# Patient Record
Sex: MALE | Race: BLACK OR AFRICAN AMERICAN | NOT HISPANIC OR LATINO | Employment: UNEMPLOYED | URBAN - METROPOLITAN AREA
[De-identification: names, ages, dates, MRNs, and addresses within clinical notes are randomized per-mention and may not be internally consistent; named-entity substitution may affect disease eponyms.]

---

## 2024-03-08 ENCOUNTER — OFFICE VISIT (OUTPATIENT)
Dept: OPHTHALMOLOGY | Facility: CLINIC | Age: 34
End: 2024-03-08
Payer: COMMERCIAL

## 2024-03-08 DIAGNOSIS — H18.603 KERATOCONUS OF BOTH EYES: Primary | ICD-10-CM

## 2024-03-08 DIAGNOSIS — H17.9 CORNEAL SCAR, RIGHT EYE: ICD-10-CM

## 2024-03-08 LAB
KMAX (OD): 83.2 DIOPTERS
KMAX (OS): 51.5 DIOPTERS
PACH THINNEST (OD): 437 MICRONS
PACH THINNEST (OS): 509 MICRONS
SIMK FLAT (OD): 66 DIOPTERS
SIMK FLAT (OS): 39.5 DIOPTERS
SIMK STEEP (OD): 67.2 DIOPTERS
SIMK STEEP (OS): 44 DIOPTERS

## 2024-03-08 PROCEDURE — 92025 CPTRIZED CORNEAL TOPOGRAPHY: CPT | Performed by: OPHTHALMOLOGY

## 2024-03-08 PROCEDURE — 99204 OFFICE O/P NEW MOD 45 MIN: CPT | Performed by: OPHTHALMOLOGY

## 2024-03-08 ASSESSMENT — VISUAL ACUITY
OS_CC+: +1
OS_CC: 20/40
OD_CC: 20/100
METHOD: SNELLEN - LINEAR

## 2024-03-08 ASSESSMENT — SLIT LAMP EXAM - LIDS
COMMENTS: NORMAL
COMMENTS: NORMAL

## 2024-03-08 ASSESSMENT — CUP TO DISC RATIO
OD_RATIO: 0.3
OS_RATIO: 0.3

## 2024-03-08 ASSESSMENT — TONOMETRY
OS_IOP_MMHG: 16
IOP_METHOD: TONOPEN
OD_IOP_MMHG: 13

## 2024-03-08 ASSESSMENT — EXTERNAL EXAM - RIGHT EYE: OD_EXAM: NORMAL

## 2024-03-08 ASSESSMENT — ENCOUNTER SYMPTOMS: EYES NEGATIVE: 1

## 2024-03-08 ASSESSMENT — EXTERNAL EXAM - LEFT EYE: OS_EXAM: NORMAL

## 2024-03-08 NOTE — PROGRESS NOTES
Assessment/Plan   Diagnoses and all orders for this visit:  Keratoconus of both eyes  Corneal scar, right eye  Referred by Dr. Jp Lee  Scar OD is inferior just below visual axis  Dicussed KCN disease nature with pt and explained OD (and possibly OS) could be fitted with rigid gas permeable (RGP) vs scleral CLs for best visual acuity  If vision OD does not improve with CL, will discuss DALK/PK  Pentacam hernandez today. High quality scans obtained and shows:  -Discussed the risk of progression and counseled against eye rubbing as this may lead to progression  -Discused corneal cross linking as a measure to prevent further progression of KCN  -Discussed the postop course, fluctuation of vision and the need for vision correction after CXL  -Discussed r/b/a of CXL including infection and haze.   -The patient understands and wishes to proceed  - Explained to the patient at length how CXL is important to decrease the risk of needing a corneal transplant later in life  - Explained that CXL will not improve vision  - Answered all pt questions  -Will schedule CXL (left eye)first  - Pt will follow up with Dr. Lee for CL fitting and with me for CXL and to monitor his cornea

## 2024-03-08 NOTE — LETTER
March 8, 2024    Jp Lee  [facility]   [address]  [facility] [address]    Patient: Salinas Barker   YOB: 1990   Date of Visit: 3/8/2024       Dear Dr. Jp Lee:    Thank you for referring Salinas Barker to me for evaluation. Below are the relevant portions of the exam, along with my assessment and plan of care.    Vision readings for this visit:  Visual Acuity (Snellen - Linear)         Right Left    Dist cc 20/100 20/40 +1    Dist ph cc NI NI          Tonometry (Tonopen, 8:39 AM)         Right Left    Pressure 13 16            Assessment/Plan:  Diagnoses and all orders for this visit:  Keratoconus of both eyes  Corneal scar, right eye  Referred by Dr. Jp Lee  Scar OD is inferior just below visual axis  Dicussed KCN disease nature with pt and explained OD (and possibly OS) could be fitted with rigid gas permeable (RGP) vs scleral CLs for best visual acuity  If vision OD does not improve with CL, will discuss DALK/PK  Pentacam hernandez today. High quality scans obtained and shows:  -Discussed the risk of progression and counseled against eye rubbing as this may lead to progression  -Discused corneal cross linking as a measure to prevent further progression of KCN  -Discussed the postop course, fluctuation of vision and the need for vision correction after CXL  -Discussed r/b/a of CXL including infection and haze.   -The patient understands and wishes to proceed  - Explained to the patient at length how CXL is important to decrease the risk of needing a corneal transplant later in life  - Explained that CXL will not improve vision  - Answered all pt questions  -Will schedule CXL (left eye)first  - Pt will follow up with Dr. Lee for CL fitting and with me for CXL and to monitor his cornea      If you have questions, please do not hesitate to call me. I look forward to following Salinas along with you.         Sincerely,        Hema Montiel MD        CC:   No Recipients

## 2024-03-08 NOTE — LETTER
March 8, 2024    Jp Sandoval OD  [facility]  [address]  [facility] [address]    Patient: Salinas Barker   YOB: 1990   Date of Visit: 3/8/2024       Dear Dr. Jp Sandoval OD:    Thank you for referring Salinas Barker to me for evaluation. Below are the relevant portions of the exam, along with my assessment and plan of care.    Vision readings for this visit:  Visual Acuity (Snellen - Linear)         Right Left    Dist cc 20/100 20/40 +1    Dist ph cc NI NI          Tonometry (Tonopen, 8:39 AM)         Right Left    Pressure 13 16            Assessment/Plan:     Keratoconus of both eyes  Corneal scar, right eye  Referred by Dr. Jp Lee  Scar OD is inferior just below visual axis  Dicussed KCN disease nature with pt and explained OD (and possibly OS) could be fitted with rigid gas permeable (RGP) vs scleral CLs for best visual acuity  If vision OD does not improve with CL, will discuss DALK/PK  Pentacam hernandez today. High quality scans obtained and shows:  -Discussed the risk of progression and counseled against eye rubbing as this may lead to progression  -Discused corneal cross linking as a measure to prevent further progression of KCN  -Discussed the postop course, fluctuation of vision and the need for vision correction after CXL  -Discussed r/b/a of CXL including infection and haze.   -The patient understands and wishes to proceed  - Explained to the patient at length how CXL is important to decrease the risk of needing a corneal transplant later in life  - Explained that CXL will not improve vision  - Answered all pt questions  -Will schedule CXL (left eye)first  - Pt will follow up with Dr. Lee for CL fitting and with me for CXL and to monitor his cornea    If you have questions, please do not hesitate to call me. I look forward to following Salinas along with you.         Sincerely,        Hema Montiel MD        CC:   No Recipients

## 2024-03-29 ENCOUNTER — APPOINTMENT (OUTPATIENT)
Dept: OPHTHALMOLOGY | Facility: CLINIC | Age: 34
End: 2024-03-29
Payer: COMMERCIAL

## 2024-04-23 ENCOUNTER — LAB (OUTPATIENT)
Dept: LAB | Facility: LAB | Age: 34
End: 2024-04-23

## 2024-07-09 ENCOUNTER — APPOINTMENT (OUTPATIENT)
Dept: OPHTHALMOLOGY | Facility: CLINIC | Age: 34
End: 2024-07-09
Payer: COMMERCIAL

## 2024-07-09 DIAGNOSIS — H18.603 KERATOCONUS OF BOTH EYES: Primary | ICD-10-CM

## 2024-07-09 DIAGNOSIS — H17.9 CORNEAL SCAR, RIGHT EYE: ICD-10-CM

## 2024-07-09 DIAGNOSIS — H10.13 ALLERGIC CONJUNCTIVITIS OF BOTH EYES: ICD-10-CM

## 2024-07-09 PROCEDURE — 99213 OFFICE O/P EST LOW 20 MIN: CPT | Performed by: OPHTHALMOLOGY

## 2024-07-09 ASSESSMENT — CONF VISUAL FIELD
OS_INFERIOR_NASAL_RESTRICTION: 0
OD_NORMAL: 1
OD_SUPERIOR_TEMPORAL_RESTRICTION: 0
OS_NORMAL: 1
OS_SUPERIOR_TEMPORAL_RESTRICTION: 0
OD_INFERIOR_NASAL_RESTRICTION: 0
OD_INFERIOR_TEMPORAL_RESTRICTION: 0
OS_INFERIOR_TEMPORAL_RESTRICTION: 0
OS_SUPERIOR_NASAL_RESTRICTION: 0
OD_SUPERIOR_NASAL_RESTRICTION: 0

## 2024-07-09 ASSESSMENT — TONOMETRY
OD_IOP_MMHG: 13
IOP_METHOD: TONOPEN
OS_IOP_MMHG: 17

## 2024-07-09 ASSESSMENT — VISUAL ACUITY
OD_CC: 20/70
OS_CC: 20/25
METHOD: SNELLEN - LINEAR
CORRECTION_TYPE: GLASSES

## 2024-07-09 ASSESSMENT — SLIT LAMP EXAM - LIDS
COMMENTS: NORMAL
COMMENTS: NORMAL

## 2024-07-09 ASSESSMENT — EXTERNAL EXAM - RIGHT EYE: OD_EXAM: NORMAL

## 2024-07-09 ASSESSMENT — ENCOUNTER SYMPTOMS: EYES NEGATIVE: 1

## 2024-07-09 ASSESSMENT — EXTERNAL EXAM - LEFT EYE: OS_EXAM: NORMAL

## 2024-07-09 NOTE — PROGRESS NOTES
"Assessment/Plan   Diagnoses and all orders for this visit:  Keratoconus of both eyes  Corneal scar, right eye  Referred by Dr. Jp Lee  Scar OD is inferior just below visual axis  Here for corneal cross linking, however patient was unaware that was why he was scheduled today, wishes to reschedule so he can take off of work.   Will call pt to reschedule CXL (left eye)    Allergic Conjunctivitis, both eyes  Discussed switching back to former laundry detergent or trying \"Free and clear\" version  Start pataday qAM 0.7%  Avoid eye rubbing  "

## 2024-08-13 ENCOUNTER — PROCEDURE VISIT (OUTPATIENT)
Dept: OPHTHALMOLOGY | Facility: CLINIC | Age: 34
End: 2024-08-13
Payer: COMMERCIAL

## 2024-08-13 ENCOUNTER — APPOINTMENT (OUTPATIENT)
Dept: OPHTHALMOLOGY | Facility: CLINIC | Age: 34
End: 2024-08-13
Payer: COMMERCIAL

## 2024-08-13 DIAGNOSIS — H17.9 CORNEAL SCAR, RIGHT EYE: ICD-10-CM

## 2024-08-13 DIAGNOSIS — H18.623 UNSTABLE KERATOCONUS OF BOTH EYES: Primary | ICD-10-CM

## 2024-08-13 PROCEDURE — 0402T COLGN CRS-LINK CRN&PACHYMTRY: CPT | Performed by: OPHTHALMOLOGY

## 2024-08-13 RX ORDER — PREDNISOLONE ACETATE 10 MG/ML
SUSPENSION/ DROPS OPHTHALMIC
Qty: 5 ML | Refills: 1 | Status: SHIPPED | OUTPATIENT
Start: 2024-08-13

## 2024-08-13 RX ORDER — IBUPROFEN 100 MG/5ML
1000 SUSPENSION, ORAL (FINAL DOSE FORM) ORAL DAILY
Qty: 10 TABLET | Refills: 0 | Status: SHIPPED | OUTPATIENT
Start: 2024-08-13 | End: 2024-08-23

## 2024-08-13 RX ORDER — OXYCODONE AND ACETAMINOPHEN 5; 325 MG/1; MG/1
1 TABLET ORAL EVERY 6 HOURS PRN
Qty: 12 TABLET | Refills: 0 | Status: SHIPPED | OUTPATIENT
Start: 2024-08-13 | End: 2024-08-16

## 2024-08-13 RX ORDER — MOXIFLOXACIN 5 MG/ML
1 SOLUTION/ DROPS OPHTHALMIC 4 TIMES DAILY
Qty: 3 ML | Refills: 1 | Status: SHIPPED | OUTPATIENT
Start: 2024-08-13 | End: 2024-08-27

## 2024-08-13 ASSESSMENT — TONOMETRY
OS_IOP_MMHG: 12
IOP_METHOD: TONOPEN

## 2024-08-13 ASSESSMENT — EXTERNAL EXAM - LEFT EYE: OS_EXAM: NORMAL

## 2024-08-13 ASSESSMENT — VISUAL ACUITY
CORRECTION_TYPE: GLASSES
OS_CC: 20/30-2
METHOD: SNELLEN - LINEAR

## 2024-08-13 ASSESSMENT — EXTERNAL EXAM - RIGHT EYE: OD_EXAM: NORMAL

## 2024-08-13 ASSESSMENT — SLIT LAMP EXAM - LIDS
COMMENTS: NORMAL
COMMENTS: NORMAL

## 2024-08-13 ASSESSMENT — ENCOUNTER SYMPTOMS: EYES NEGATIVE: 1

## 2024-08-13 NOTE — PATIENT INSTRUCTIONS
Postoperative Instructions: Corneal collagen cross linking  Postoperative drop and medications regimen:  Moxifloxacin eye drops 4 times/day for 2 weeks  Prednisolone acetate eye drops as following:  3 times/day for 1 week (start 3 days after the procedure) then,  2 times/day for 1 week then,  Once/day for 2 weeks then stop  Vitamin C tablets 1gm: once/day for 10 days  Preservative free artificial tears: 4-5 times/day or as needed for foreign body sensation  Pain killer (oxycodone-acetaminophen): 1 tablet every 4-6 hours for pain  Be sure to separate the different eye drops by 5 minutes  All eye drops are to be used during daytime only (no need to do the drops when you are asleep).  Activities and Restrictions:  Be careful not to rub, bump, or press on your operative eye for 1-2 months  You may bathe the day after surgery. When bathing, keep water out of the operative eye.  Do not go swimming, use a hot tub, spa or whirlpool for at least 10 days to reduce the risk of infection.  Resume driving only when advised by your doctor and when you feel confident and safe.  You may return to work with a light workload after four or five days.  Do not use eye makeup, colognes, or aftershave for 5 days. Do not use mascara for 2 weeks. Use care when removing makeup. Avoid rubbing your eyelids aggressively. A magnifying mirror will make it easier to apply you makeup. Using fresh mascara or liquid eyeliner after surgery will reduce the risk of infection from the product being contaminated.  Wear sunglasses for comfort as needed. You may be slightly more light sensitive for the first couple of weeks after surgery.  Avoid dirty, kranthi and smoky environments for 1 week. The first reflex might be to rub the eye which we do not want you to do.  Remember that you should wear safety glasses if you are doing anything during which you might get hit in the eye, poked in the eye or have anything splash in your eye after surgery. Industrial  grade, wrap-around safety glasses may be obtained at any hardware store or home center.  Use recommended artificial tears on a regular basis the first month after surgery even if you do not feel as though you need them.  Your glasses prescription can be updated 2 months after surgery when the vision has stabilized.  Common Symptoms during Recovery:  It is common to experience varying degrees of discomfort beginning 30 to 90 minutes following the surgery as the numbing drops begin to wear off. Many patients describe this sensation as feeling like an eyelash is in the eye or lodged beneath the contact lens. This rarely lasts more than a few days and is a normal part of the healing process. Remember that the doctor will put a contact lens in your eye directly after surgery to act as a bandage. It is important not to remove the contact lens for any reason because without it, the pain will increase and your eye may not heal normally.  A contact lens that has fallen out accidentally should NOT be reinserted. If you do so, the contact lens has likely been contaminated therefore increasing the risk of infection, which could permanently affect your vision. Instead, continue using your eye drops, gently tape the eye closed and call us so that we can insert a new contact lens that same day or the following morning.  The bandage contact lens in combination with lubricating drops, prescription eye drops (to be used as directed by the surgical team) and mild, oral analgesics should provide relief. Also, you can place a cold washcloth or an ice pack over the eye to help relieve discomfort (10 minutes on, 10 minutes off and repeat as needed). In most cases, the bandage contact lens will be removed by the doctors on the 4th or 5th postoperative day. After this, the eye will become more comfortable and your vision will begin to improve.  You may experience watery eyes, a runny nose, light sensitivity and eye redness during the early  postoperative period. This is normal and is caused by the post-surgical eye irritation.  You may experience “hazy” vision during the recovery period. The vision should begin to stabilize after the bandage contact lens is removed from your eye. Although visual recovery is slower for some patients, most experience improved vision by one month.  Some patients have dry eye symptoms causing a gaye sensation or eye tenderness. This may persist for several weeks following the surgery and is best treated with frequent artificial tear use.  Infection is very rare but is the most serious problem that can present after this procedure. It is important to be evaluated immediately if you experience extreme pain, eye redness, sudden blurred vision and/or discharge.  It is very important that you make it to all of your postoperative examinations so that if any changes or complications arise, they can be addressed immediately.

## 2024-08-13 NOTE — PROGRESS NOTES
Assessment/Plan   Diagnoses and all orders for this visit:  Unstable keratoconus of both eyes  Keratoconus, unstable, [id number]  Pt here for cross linking OS  Discussed with pt r/b/a. They would like to proceed  After 3 rounds of proparacine and betadine drops, the eyelids were cleaned with a betadine swab. An eyelid speculum was placed. then, using a 15 blade the central 8mm of the corneal epithelium were scraped. Then, the eyelid speculum was removed. Betadine drops were placed twice. Then, the ocular surface with rinsed with sterile eye wash.  Then, the patient was transferred to the cross linking room where the riboflavin sloution (lot # [id number])was placed on the surface of the cornea every 2 minutes for 30 minutes. Then, the AC was checked for flare, the cornea was checked for thickness (intra-op pachy 538). The, the patient was placed under the UV light of the [employer] cross linking device and riboflavin drops were instilled every 2 minutes for another 30 minutes. A BCL was placed on the ocular surface. The patient tolerated the procedure well with no complications.     Moxi qid  Start PF tid after 3 days  ATs  vicodin q4-6 hours today for pain control  Vit C 1gm qday  f/u tomorrow   -     moxifloxacin (Vigamox) 0.5 % ophthalmic solution; Administer 1 drop into the left eye 4 times a day for 14 days.  -     prednisoLONE acetate (Pred-Forte) 1 % ophthalmic suspension; 1 drop TID left eye starting 3 days after the procedure  -     ascorbic acid (Vitamin C) 1,000 mg tablet; Take 1 tablet (1,000 mg) by mouth once daily for 10 days.  -     oxyCODONE-acetaminophen (Percocet) 5-325 mg tablet; Take 1 tablet by mouth every 6 hours if needed for severe pain (7 - 10) for up to 3 days.  Corneal scar, right eye

## 2024-08-14 ENCOUNTER — OFFICE VISIT (OUTPATIENT)
Dept: OPHTHALMOLOGY | Facility: CLINIC | Age: 34
End: 2024-08-14
Payer: COMMERCIAL

## 2024-08-14 DIAGNOSIS — H18.623 UNSTABLE KERATOCONUS OF BOTH EYES: Primary | ICD-10-CM

## 2024-08-14 ASSESSMENT — CONF VISUAL FIELD
OD_SUPERIOR_TEMPORAL_RESTRICTION: 0
OS_INFERIOR_NASAL_RESTRICTION: 0
METHOD: COUNTING FINGERS
OS_NORMAL: 1
OS_SUPERIOR_NASAL_RESTRICTION: 0
OD_NORMAL: 1
OD_INFERIOR_NASAL_RESTRICTION: 0
OD_SUPERIOR_NASAL_RESTRICTION: 0
OS_SUPERIOR_TEMPORAL_RESTRICTION: 0
OD_INFERIOR_TEMPORAL_RESTRICTION: 0
OS_INFERIOR_TEMPORAL_RESTRICTION: 0

## 2024-08-14 ASSESSMENT — VISUAL ACUITY
METHOD: SNELLEN - LINEAR
OS_SC: 20/100
OD_PH_SC: 20/60
OS_PH_SC: 20/50-1
OD_SC: 20/200

## 2024-08-14 ASSESSMENT — ENCOUNTER SYMPTOMS
ALLERGIC/IMMUNOLOGIC NEGATIVE: 0
MUSCULOSKELETAL NEGATIVE: 0
EYES NEGATIVE: 1
PSYCHIATRIC NEGATIVE: 0
HEMATOLOGIC/LYMPHATIC NEGATIVE: 0
ENDOCRINE NEGATIVE: 0
RESPIRATORY NEGATIVE: 0
GASTROINTESTINAL NEGATIVE: 0
CARDIOVASCULAR NEGATIVE: 0
CONSTITUTIONAL NEGATIVE: 0
NEUROLOGICAL NEGATIVE: 0

## 2024-08-14 ASSESSMENT — EXTERNAL EXAM - RIGHT EYE: OD_EXAM: NORMAL

## 2024-08-14 ASSESSMENT — SLIT LAMP EXAM - LIDS: COMMENTS: NORMAL

## 2024-08-14 ASSESSMENT — EXTERNAL EXAM - LEFT EYE: OS_EXAM: NORMAL

## 2024-08-14 NOTE — PROGRESS NOTES
Assessment/Plan   Diagnoses and all orders for this visit:  Unstable keratoconus of both eyes  PO day 1. 1 drop moxiflox instilled in office as patient has not picked up drops from pharmacy yet.   -     moxifloxacin (Vigamox) 0.5 % ophthalmic solution; Administer 1 drop into the left eye 4 times a day for 14 days.  -     prednisoLONE acetate (Pred-Forte) 1 % ophthalmic suspension; 1 drop TID left eye starting 3 days after the procedure  -     ascorbic acid (Vitamin C) 1,000 mg tablet; Take 1 tablet (1,000 mg) by mouth once daily for 10 days.  -     oxyCODONE-acetaminophen (Percocet) 5-325 mg tablet; Take 1 tablet by mouth every 6 hours if needed for severe pain (7 - 10) for up to 3 days.  RTC w/ Dr. Montiel in 1 week. ED precautions for any worsening RSVP.  F/up w/ optom 10/04 at 10:45 for med [email or web address] CL fitting.

## 2024-08-23 ENCOUNTER — APPOINTMENT (OUTPATIENT)
Dept: OPHTHALMOLOGY | Facility: CLINIC | Age: 34
End: 2024-08-23
Payer: COMMERCIAL

## 2024-08-23 DIAGNOSIS — H18.623 UNSTABLE KERATOCONUS OF BOTH EYES: Primary | ICD-10-CM

## 2024-08-23 PROCEDURE — 99212 OFFICE O/P EST SF 10 MIN: CPT | Performed by: OPHTHALMOLOGY

## 2024-08-23 ASSESSMENT — SLIT LAMP EXAM - LIDS
COMMENTS: NORMAL
COMMENTS: NORMAL

## 2024-08-23 ASSESSMENT — VISUAL ACUITY
METHOD: SNELLEN - LINEAR
OS_CC: 20/40

## 2024-08-23 ASSESSMENT — EXTERNAL EXAM - LEFT EYE: OS_EXAM: NORMAL

## 2024-08-23 ASSESSMENT — TONOMETRY: IOP_METHOD: GOLDMANN APPLANATION

## 2024-08-23 ASSESSMENT — ENCOUNTER SYMPTOMS: EYES NEGATIVE: 1

## 2024-08-23 ASSESSMENT — EXTERNAL EXAM - RIGHT EYE: OD_EXAM: NORMAL

## 2024-08-23 NOTE — PROGRESS NOTES
POW 1 s/p CXL OS  Removed BCL  Epi healed  PF taper  moxi till out  ATs  1 month with optom   As scheduled for CXL OD (may defer given scar)

## 2024-09-10 ENCOUNTER — APPOINTMENT (OUTPATIENT)
Dept: OPHTHALMOLOGY | Facility: CLINIC | Age: 34
End: 2024-09-10
Payer: COMMERCIAL

## 2024-09-17 ENCOUNTER — APPOINTMENT (OUTPATIENT)
Dept: OPHTHALMOLOGY | Facility: CLINIC | Age: 34
End: 2024-09-17
Payer: COMMERCIAL

## 2024-10-04 ENCOUNTER — APPOINTMENT (OUTPATIENT)
Dept: OPHTHALMOLOGY | Facility: CLINIC | Age: 34
End: 2024-10-04
Payer: COMMERCIAL